# Patient Record
Sex: MALE | Race: WHITE | Employment: OTHER | URBAN - METROPOLITAN AREA
[De-identification: names, ages, dates, MRNs, and addresses within clinical notes are randomized per-mention and may not be internally consistent; named-entity substitution may affect disease eponyms.]

---

## 2019-07-17 ENCOUNTER — HOSPITAL ENCOUNTER (EMERGENCY)
Age: 64
Discharge: HOME | End: 2019-07-17
Attending: EMERGENCY MEDICINE | Admitting: EMERGENCY MEDICINE
Payer: COMMERCIAL

## 2019-07-17 VITALS
HEART RATE: 85 BPM | OXYGEN SATURATION: 95 % | WEIGHT: 172.4 LBS | BODY MASS INDEX: 25.53 KG/M2 | SYSTOLIC BLOOD PRESSURE: 127 MMHG | DIASTOLIC BLOOD PRESSURE: 74 MMHG | HEIGHT: 69 IN | RESPIRATION RATE: 12 BRPM | TEMPERATURE: 98.8 F

## 2019-07-17 DIAGNOSIS — T18.128A FOOD IMPACTION OF ESOPHAGUS, INITIAL ENCOUNTER: Primary | ICD-10-CM

## 2019-07-17 PROCEDURE — 99282 EMERGENCY DEPT VISIT SF MDM: CPT

## 2019-07-17 RX ORDER — PANTOPRAZOLE SODIUM 40 MG/1
40 GRANULE, DELAYED RELEASE ORAL DAILY
Qty: 30 EACH | Refills: 0 | Status: SHIPPED | OUTPATIENT
Start: 2019-07-17 | End: 2019-08-16

## 2019-07-18 NOTE — DISCHARGE INSTRUCTIONS
Patient Education     Esophageal Impaction: After Your Visit  Your Care Instructions  The esophagus is the tube that connects your throat to your stomach. The muscles in your esophagus contract to move food and liquid from your mouth and down your throat and esophagus and into your stomach. When food or an object cannot get past a certain point in your esophagus, it may get stuck or impacted. Sharp, long, or large objects can scratch or cut your esophagus if they get stuck. When this happens, these areas can bleed or get infected. An esophageal impaction most often occurs when a person swallows a large piece of food that has not been chewed well. It can also be caused by:  · Swelling, irritation, or a spasm of the esophagus. · Problems that cause the esophagus to narrow. · Gastroesophageal reflux disease (GERD), which is the backward flow of stomach acid into the esophagus. · Esophageal cancer. Your doctor probably removed the food or object during your exam. Or your doctor may have given you some medicine to relax the muscles in your esophagus, so that the food or object could pass from your esophagus down into your stomach. Your throat and esophagus may feel sore and it may hurt for a few days when you eat or swallow. Follow-up care is a key part of your treatment and safety. Be sure to make and go to all appointments, and call your doctor if you are having problems. It is also a good idea to know your test results and keep a list of the medicines you take. How can you care for yourself at home? · If your doctor prescribed antibiotics, take them as directed. Do not stop taking them just because you feel better. You need to take the full course of antibiotics. · Your doctor may prescribe medicine or recommend over-the-counter medicine to treat other health problems that may increase your chances of getting food stuck in your esophagus. Take your medicine exactly as prescribed.  Call your doctor if you think you are having a problem with your medicine. You will get more details on the specific medicine your doctor prescribes. · Drink liquids. If swallowing liquids is easy, try eating soft foods like bread or bananas. If these foods are easy to swallow, start to add other foods. · Avoid very hot or cold foods if they cause your esophagus to spasm. · Do not smoke. Smoking can irritate your esophagus. If you need help quitting, talk to your doctor about stop-smoking programs and medicines. These can increase your chances of quitting for good. To prevent esophageal impaction:  · Cut food into small pieces. · Eat slowly, take small bites, and chew your food all the way. · Do not laugh or talk with food in your mouth. · Do not eat or drink while you are doing something else, such as when you drive. · Do not hold objects, such as pins, nails, or toothpicks, in your mouth or between your lips. · Limit how much alcohol you drink while you eat. When should you call for help? Call 911 anytime you think you may need emergency care. For example, call if:  · You have chest pain. · You vomit a large amount of blood or what looks like coffee grounds. · You pass maroon or very bloody stools. · You cannot swallow liquids, food, or saliva. Call your doctor now or seek immediate medical care if:  · You have signs of an infection, such as:  ¨ Pain, swelling, or tenderness in or around your throat, esophagus, neck, or belly. ¨ A fever. ¨ A cough. ¨ Shortness of breath. · You vomit a small amount of blood or what looks like coffee grounds. · You have trouble swallowing. · You vomited more than one time since you had the food or object removed from your esophagus. · Your stools are black and tarlike or have streaks of blood. Watch closely for changes in your health, and be sure to contact your doctor if:  · You feel like food or pills catch in your throat or your esophagus, but you can still swallow.   · Your appetite is poor. · You do not get better as expected. Where can you learn more? Go to [email or web address]  Enter M354 in the search box to learn more about \"Esophageal Impaction: After Your Visit. \"   © 3670-2332 [employer]. Care instructions adapted under license by [facility] (which disclaims liability or warranty for this information). This care instruction is for use with your licensed healthcare professional. If you have questions about a medical condition or this instruction, always ask your healthcare professional. [address] any warranty or liability for your use of this information.   Content Version: 5.0.150783; Last Revised: February 19, 2013

## 2019-07-18 NOTE — ED PROVIDER NOTES
The history is provided by the patient. No  was used. Foreign Body Swallowed   The current episode started 1 to 2 hours ago. The foreign body is suspected to be swallowed. The foreign body is food. Associated symptoms include trouble swallowing. Pertinent negatives include no fever, no hearing loss, no congestion, no drainage, no nosebleeds, no drooling, no sore throat, no difficulty breathing, no choking, no cough, no wheezing, no chest pain, no vomiting, no abdominal pain, no anal discharge, no anal bleeding and no rectal pain. Associated medical issues include prior foreign body removal and esophageal disease. Past Medical History:   Diagnosis Date    Hypertension        History reviewed. No pertinent surgical history. History reviewed. No pertinent family history. Social History     Socioeconomic History    Marital status:      Spouse name: Not on file    Number of children: Not on file    Years of education: Not on file    Highest education level: Not on file   Occupational History    Not on file   Social Needs    Financial resource strain: Not on file    Food insecurity:     Worry: Not on file     Inability: Not on file    Transportation needs:     Medical: Not on file     Non-medical: Not on file   Tobacco Use    Smoking status: Never Smoker    Smokeless tobacco: Never Used   Substance and Sexual Activity    Alcohol use:  Yes     Alcohol/week: 6.0 standard drinks     Types: 6 Glasses of wine per week    Drug use: Not on file    Sexual activity: Not on file   Lifestyle    Physical activity:     Days per week: Not on file     Minutes per session: Not on file    Stress: Not on file   Relationships    Social connections:     Talks on phone: Not on file     Gets together: Not on file     Attends [employer] service: Not on file     Active member of club or organization: Not on file     Attends meetings of clubs or organizations: Not on file Relationship status: Not on file    Intimate partner violence:     Fear of current or ex partner: Not on file     Emotionally abused: Not on file     Physically abused: Not on file     Forced sexual activity: Not on file   Other Topics Concern    Not on file   Social History Narrative    Not on file         ALLERGIES: Patient has no known allergies. Review of Systems   Constitutional: Negative for activity change, chills and fever. HENT: Positive for trouble swallowing. Negative for congestion, drooling, hearing loss, nosebleeds, sore throat and voice change. Eyes: Negative for visual disturbance. Respiratory: Negative for cough, choking, shortness of breath and wheezing. Cardiovascular: Negative for chest pain and palpitations. Gastrointestinal: Negative for abdominal pain, anal bleeding, constipation, diarrhea, nausea, rectal pain and vomiting. Genitourinary: Negative for difficulty urinating, dysuria, hematuria and urgency. Musculoskeletal: Negative for back pain, neck pain and neck stiffness. Skin: Negative for color change. Allergic/Immunologic: Negative for immunocompromised state. Neurological: Negative for dizziness, seizures, syncope, weakness, light-headedness, numbness and headaches. Psychiatric/Behavioral: Negative for behavioral problems, confusion, hallucinations, self-injury and suicidal ideas. Vitals:    07/17/19 2039 07/17/19 2115   BP: 158/82 127/74   Pulse: 85    Resp: 12    Temp: 98.8 °F (37.1 °C)    SpO2: 95% 95%   Weight: 78.2 kg (172 lb 6.4 oz)    Height: 5' 9\" (1.753 m)             Physical Exam   Constitutional: He appears well-developed and well-nourished. No distress. HENT:   Head: Atraumatic. Eyes: EOM are normal.   Neck: No tracheal deviation present. Cardiovascular:   Warm and well perfused   Pulmonary/Chest: Effort normal. No respiratory distress. Musculoskeletal: Normal range of motion. Neurological: He is alert.  Coordination normal. Skin: Skin is warm and dry. He is not diaphoretic. Psychiatric: He has a normal mood and affect. His behavior is normal. Judgment and thought content normal.   Nursing note and vitals reviewed. MDM     This is a 77-year-old male with past medical history, review of systems, physical exam as above presenting with complaints of retained esophageal food impaction. Patient states episode onset approximately an hour prior to arrival, states that it occurred while eating chicken. He endorses frequent episodes, however states that he typically resolve after a relatively short period of time. He endorses previous endoscopy, last intervention approximately 30 years ago. He is scheduled to follow with a gastroenterologist for refractory GERD-like symptoms, for which his primary care physician has been treating him. Physical exam is remarkable for well-appearing elderly male, in no acute distress, with clear breath sounds, no osseous foreign body visible in the posterior pharynx. Patient states while not initially able to pass fluids, symptoms have begun to improve without resolution. Plan to administer carbonated fluids, IM glucagon, reassessed, and consider consultation with GI for endoscopy. Disposition pending. Procedures    Patient states discomfort resolved, able to drink fluids w/o difficulty, will add PPI to H2 blocker for worsening chronic GERD, advise to f/u with GI as scheduled, PCP f/u and return precautions given.

## 2019-07-18 NOTE — ED TRIAGE NOTES
Triage: Pt advises that he has either chicken or lasagna stuck in his throat. Pt does not appear to be in any respiratory distress. Pt advsies he feels like he just has something stuck in his throat.